# Patient Record
Sex: MALE | Race: BLACK OR AFRICAN AMERICAN
[De-identification: names, ages, dates, MRNs, and addresses within clinical notes are randomized per-mention and may not be internally consistent; named-entity substitution may affect disease eponyms.]

---

## 2023-08-15 ENCOUNTER — RX ONLY (RX ONLY)
Age: 73
End: 2023-08-15

## 2023-08-15 ENCOUNTER — OFFICE (OUTPATIENT)
Dept: URBAN - METROPOLITAN AREA CLINIC 27 | Facility: CLINIC | Age: 73
Setting detail: OPHTHALMOLOGY
End: 2023-08-15
Payer: MEDICARE

## 2023-08-15 DIAGNOSIS — H33.322: ICD-10-CM

## 2023-08-15 DIAGNOSIS — H35.413: ICD-10-CM

## 2023-08-15 DIAGNOSIS — H11.153: ICD-10-CM

## 2023-08-15 DIAGNOSIS — H17.9: ICD-10-CM

## 2023-08-15 DIAGNOSIS — E11.9: ICD-10-CM

## 2023-08-15 DIAGNOSIS — H25.13: ICD-10-CM

## 2023-08-15 DIAGNOSIS — H16.223: ICD-10-CM

## 2023-08-15 PROCEDURE — 99204 OFFICE O/P NEW MOD 45 MIN: CPT | Performed by: OPHTHALMOLOGY

## 2023-08-15 ASSESSMENT — REFRACTION_AUTOREFRACTION
OS_AXIS: 129
OD_CYLINDER: +0.75
OS_CYLINDER: +0.50
OS_SPHERE: -5.50
OD_AXIS: 121
OD_SPHERE: -7.25

## 2023-08-15 ASSESSMENT — REFRACTION_CURRENTRX
OS_OVR_VA: 20/
OD_VPRISM_DIRECTION: SV
OS_VPRISM_DIRECTION: SV
OD_OVR_VA: 20/
OS_SPHERE: +2.75
OD_SPHERE: +2.75

## 2023-08-15 ASSESSMENT — KERATOMETRY
METHOD_AUTO_MANUAL: AUTO
OD_K1POWER_DIOPTERS: 45.25
OD_AXISANGLE_DEGREES: 067
OS_K2POWER_DIOPTERS: 45.75
OD_K2POWER_DIOPTERS: 45.75
OS_AXISANGLE_DEGREES: 106
OS_K1POWER_DIOPTERS: 45.00

## 2023-08-15 ASSESSMENT — VISUAL ACUITY
OD_BCVA: 20/25-3
OS_BCVA: 20/60

## 2023-08-15 ASSESSMENT — SPHEQUIV_DERIVED
OD_SPHEQUIV: -6.875
OS_SPHEQUIV: -5.25

## 2023-08-15 ASSESSMENT — SUPERFICIAL PUNCTATE KERATITIS (SPK)
OS_SPK: 1+
OD_SPK: 1+

## 2023-08-15 ASSESSMENT — CONFRONTATIONAL VISUAL FIELD TEST (CVF)
OD_FINDINGS: FULL
OS_FINDINGS: FULL

## 2023-08-15 ASSESSMENT — TONOMETRY
OD_IOP_MMHG: 15
OS_IOP_MMHG: 13

## 2023-08-15 ASSESSMENT — AXIALLENGTH_DERIVED
OD_AL: 25.7104
OS_AL: 25.0314

## 2023-10-16 ENCOUNTER — OFFICE (OUTPATIENT)
Dept: URBAN - METROPOLITAN AREA CLINIC 27 | Facility: CLINIC | Age: 73
Setting detail: OPHTHALMOLOGY
End: 2023-10-16
Payer: MEDICARE

## 2023-10-16 DIAGNOSIS — H25.13: ICD-10-CM

## 2023-10-16 DIAGNOSIS — H17.9: ICD-10-CM

## 2023-10-16 DIAGNOSIS — H16.223: ICD-10-CM

## 2023-10-16 DIAGNOSIS — H11.153: ICD-10-CM

## 2023-10-16 PROCEDURE — 99213 OFFICE O/P EST LOW 20 MIN: CPT | Performed by: OPHTHALMOLOGY

## 2023-10-16 ASSESSMENT — TONOMETRY
OD_IOP_MMHG: 14
OS_IOP_MMHG: 15

## 2023-10-16 ASSESSMENT — SUPERFICIAL PUNCTATE KERATITIS (SPK)
OS_SPK: 1+
OD_SPK: 1+

## 2023-10-17 ASSESSMENT — KERATOMETRY
OD_K1POWER_DIOPTERS: 45.50
OS_K2POWER_DIOPTERS: 45.75
OS_K1POWER_DIOPTERS: 44.75
OD_AXISANGLE_DEGREES: 90
OS_AXISANGLE_DEGREES: 98
OD_K2POWER_DIOPTERS: 45.75
METHOD_AUTO_MANUAL: AUTO

## 2023-10-17 ASSESSMENT — REFRACTION_CURRENTRX
OD_OVR_VA: 20/
OD_VPRISM_DIRECTION: SV
OS_OVR_VA: 20/
OD_SPHERE: +2.75
OS_VPRISM_DIRECTION: SV
OS_SPHERE: +2.75

## 2023-10-17 ASSESSMENT — REFRACTION_AUTOREFRACTION
OS_AXIS: 137
OS_CYLINDER: +0.50
OS_SPHERE: -5.00
OD_SPHERE: ERR

## 2023-10-17 ASSESSMENT — SPHEQUIV_DERIVED: OS_SPHEQUIV: -4.75

## 2023-10-17 ASSESSMENT — VISUAL ACUITY
OD_BCVA: 20/25-2
OS_BCVA: 20/60-2

## 2023-10-17 ASSESSMENT — AXIALLENGTH_DERIVED: OS_AL: 24.8649

## 2023-10-31 ENCOUNTER — OFFICE (OUTPATIENT)
Dept: URBAN - METROPOLITAN AREA CLINIC 27 | Facility: CLINIC | Age: 73
Setting detail: OPHTHALMOLOGY
End: 2023-10-31
Payer: MEDICARE

## 2023-10-31 DIAGNOSIS — H17.9: ICD-10-CM

## 2023-10-31 DIAGNOSIS — H25.11: ICD-10-CM

## 2023-10-31 DIAGNOSIS — H25.13: ICD-10-CM

## 2023-10-31 DIAGNOSIS — H16.223: ICD-10-CM

## 2023-10-31 DIAGNOSIS — H11.153: ICD-10-CM

## 2023-10-31 PROCEDURE — 99213 OFFICE O/P EST LOW 20 MIN: CPT | Performed by: OPHTHALMOLOGY

## 2023-10-31 PROCEDURE — 92136 OPHTHALMIC BIOMETRY: CPT | Mod: TC | Performed by: OPHTHALMOLOGY

## 2023-10-31 PROCEDURE — 92136 OPHTHALMIC BIOMETRY: CPT | Mod: 26,RT | Performed by: OPHTHALMOLOGY

## 2023-10-31 ASSESSMENT — REFRACTION_CURRENTRX
OD_OVR_VA: 20/
OS_OVR_VA: 20/
OS_VPRISM_DIRECTION: SV
OD_VPRISM_DIRECTION: SV
OS_SPHERE: +2.75
OD_SPHERE: +2.75

## 2023-10-31 ASSESSMENT — TONOMETRY
OS_IOP_MMHG: 11
OD_IOP_MMHG: 12

## 2023-10-31 ASSESSMENT — VISUAL ACUITY
OS_BCVA: 20/60-2
OD_BCVA: 20/25-2

## 2023-10-31 ASSESSMENT — REFRACTION_AUTOREFRACTION
OS_SPHERE: -5.00
OS_AXIS: 137
OS_CYLINDER: +0.50
OD_SPHERE: ERR

## 2023-10-31 ASSESSMENT — SPHEQUIV_DERIVED: OS_SPHEQUIV: -4.75

## 2023-10-31 ASSESSMENT — SUPERFICIAL PUNCTATE KERATITIS (SPK)
OD_SPK: 1+
OS_SPK: 1+

## 2023-11-02 ENCOUNTER — AMBULATORY SURGERY CENTER (OUTPATIENT)
Dept: URBAN - METROPOLITAN AREA SURGERY 19 | Facility: SURGERY | Age: 73
Setting detail: OPHTHALMOLOGY
End: 2023-11-02
Payer: MEDICARE

## 2023-11-02 DIAGNOSIS — H25.11: ICD-10-CM

## 2023-11-02 DIAGNOSIS — H52.211: ICD-10-CM

## 2023-11-02 PROCEDURE — 66984 XCAPSL CTRC RMVL W/O ECP: CPT | Mod: RT | Performed by: OPHTHALMOLOGY

## 2023-11-02 PROCEDURE — FEMTO FEMTOSECOND LASER: Mod: GY | Performed by: OPHTHALMOLOGY

## 2023-11-02 PROCEDURE — A9270 NON-COVERED ITEM OR SERVICE: HCPCS | Mod: GY | Performed by: OPHTHALMOLOGY

## 2023-11-03 ENCOUNTER — OFFICE (OUTPATIENT)
Dept: URBAN - METROPOLITAN AREA CLINIC 27 | Facility: CLINIC | Age: 73
Setting detail: OPHTHALMOLOGY
End: 2023-11-03
Payer: MEDICARE

## 2023-11-03 ENCOUNTER — RX ONLY (RX ONLY)
Age: 73
End: 2023-11-03

## 2023-11-03 DIAGNOSIS — Z96.1: ICD-10-CM

## 2023-11-03 PROBLEM — H25.13 CATARACT NUCLEAR SCLEROSIS AGE RELATED; RIGHT EYE, BOTH EYES: Status: ACTIVE | Noted: 2023-10-31

## 2023-11-03 PROBLEM — H25.11 CATARACT NUCLEAR SCLEROSIS AGE RELATED; RIGHT EYE, BOTH EYES: Status: ACTIVE | Noted: 2023-10-31

## 2023-11-03 PROCEDURE — 99024 POSTOP FOLLOW-UP VISIT: CPT

## 2023-11-03 ASSESSMENT — SUPERFICIAL PUNCTATE KERATITIS (SPK)
OS_SPK: 1+
OD_SPK: 1+

## 2023-11-03 ASSESSMENT — REFRACTION_CURRENTRX
OS_VPRISM_DIRECTION: SV
OD_VPRISM_DIRECTION: SV
OS_OVR_VA: 20/
OD_SPHERE: +2.75
OD_OVR_VA: 20/
OS_SPHERE: +2.75

## 2023-11-03 ASSESSMENT — REFRACTION_AUTOREFRACTION
OS_CYLINDER: +0.25
OD_SPHERE: -0.50
OS_AXIS: 112
OD_CYLINDER: +1.00
OS_SPHERE: -5.50
OD_AXIS: 148

## 2023-11-03 ASSESSMENT — SPHEQUIV_DERIVED
OS_SPHEQUIV: -5.375
OD_SPHEQUIV: 0

## 2023-11-03 ASSESSMENT — CONFRONTATIONAL VISUAL FIELD TEST (CVF)
OD_FINDINGS: FULL
OS_FINDINGS: FULL

## 2023-11-09 ENCOUNTER — OFFICE (OUTPATIENT)
Dept: URBAN - METROPOLITAN AREA CLINIC 27 | Facility: CLINIC | Age: 73
Setting detail: OPHTHALMOLOGY
End: 2023-11-09
Payer: MEDICARE

## 2023-11-09 DIAGNOSIS — Z96.1: ICD-10-CM

## 2023-11-09 PROCEDURE — 99024 POSTOP FOLLOW-UP VISIT: CPT | Performed by: OPHTHALMOLOGY

## 2023-11-09 ASSESSMENT — REFRACTION_AUTOREFRACTION
OD_CYLINDER: +0.25
OD_SPHERE: -1.00
OS_AXIS: 116
OD_AXIS: 105
OS_SPHERE: -6.00
OS_CYLINDER: +0.75

## 2023-11-09 ASSESSMENT — REFRACTION_CURRENTRX
OD_VPRISM_DIRECTION: SV
OD_OVR_VA: 20/
OS_OVR_VA: 20/
OS_SPHERE: +2.75
OD_SPHERE: +2.75
OS_VPRISM_DIRECTION: SV

## 2023-11-09 ASSESSMENT — SPHEQUIV_DERIVED
OS_SPHEQUIV: -5.625
OD_SPHEQUIV: -0.875

## 2023-11-09 ASSESSMENT — SUPERFICIAL PUNCTATE KERATITIS (SPK)
OD_SPK: 1+
OS_SPK: 1+

## 2023-11-30 ENCOUNTER — OFFICE (OUTPATIENT)
Dept: URBAN - METROPOLITAN AREA CLINIC 27 | Facility: CLINIC | Age: 73
Setting detail: OPHTHALMOLOGY
End: 2023-11-30
Payer: MEDICARE

## 2023-11-30 DIAGNOSIS — H25.12: ICD-10-CM

## 2023-11-30 PROBLEM — Z96.1 PSEUDOPHAKIA-1 DAY P/O CAT W/ IOL: Status: ACTIVE | Noted: 2023-11-30

## 2023-11-30 PROCEDURE — 92136 OPHTHALMIC BIOMETRY: CPT | Mod: 26,LT | Performed by: OPHTHALMOLOGY

## 2023-11-30 ASSESSMENT — REFRACTION_AUTOREFRACTION
OS_AXIS: 113
OD_SPHERE: -0.50
OD_AXIS: 104
OD_CYLINDER: +0.25
OS_SPHERE: -5.75
OS_CYLINDER: +1.00

## 2023-11-30 ASSESSMENT — SUPERFICIAL PUNCTATE KERATITIS (SPK)
OS_SPK: 1+
OD_SPK: 1+

## 2023-11-30 ASSESSMENT — REFRACTION_CURRENTRX
OS_SPHERE: +2.75
OD_VPRISM_DIRECTION: SV
OS_OVR_VA: 20/
OD_OVR_VA: 20/
OD_SPHERE: +2.75
OS_VPRISM_DIRECTION: SV

## 2023-11-30 ASSESSMENT — SPHEQUIV_DERIVED
OD_SPHEQUIV: -0.375
OS_SPHEQUIV: -5.25

## 2024-01-18 ENCOUNTER — AMBULATORY SURGERY CENTER (OUTPATIENT)
Dept: URBAN - METROPOLITAN AREA SURGERY 19 | Facility: SURGERY | Age: 74
Setting detail: OPHTHALMOLOGY
End: 2024-01-18
Payer: MEDICARE

## 2024-01-18 DIAGNOSIS — H25.12: ICD-10-CM

## 2024-01-18 DIAGNOSIS — H52.212: ICD-10-CM

## 2024-01-18 PROCEDURE — A9270 NON-COVERED ITEM OR SERVICE: HCPCS | Mod: GY | Performed by: OPHTHALMOLOGY

## 2024-01-18 PROCEDURE — FEMTO FEMTOSECOND LASER: Mod: GY | Performed by: OPHTHALMOLOGY

## 2024-01-18 PROCEDURE — 66984 XCAPSL CTRC RMVL W/O ECP: CPT | Mod: 79,LT | Performed by: OPHTHALMOLOGY

## 2024-01-18 PROCEDURE — V2787 ASTIGMATISM-CORRECT FUNCTION: HCPCS | Performed by: OPHTHALMOLOGY

## 2024-01-19 ENCOUNTER — OFFICE (OUTPATIENT)
Dept: URBAN - METROPOLITAN AREA CLINIC 27 | Facility: CLINIC | Age: 74
Setting detail: OPHTHALMOLOGY
End: 2024-01-19
Payer: MEDICARE

## 2024-01-19 ENCOUNTER — RX ONLY (RX ONLY)
Age: 74
End: 2024-01-19

## 2024-01-19 DIAGNOSIS — Z96.1: ICD-10-CM

## 2024-01-19 DIAGNOSIS — H26.492: ICD-10-CM

## 2024-01-19 PROCEDURE — 99024 POSTOP FOLLOW-UP VISIT: CPT | Performed by: OPTOMETRIST

## 2024-01-19 ASSESSMENT — REFRACTION_CURRENTRX
OD_VPRISM_DIRECTION: SV
OS_SPHERE: +2.75
OS_VPRISM_DIRECTION: SV
OD_SPHERE: +2.75
OD_OVR_VA: 20/
OS_OVR_VA: 20/

## 2024-01-19 ASSESSMENT — REFRACTION_AUTOREFRACTION
OD_CYLINDER: +0.25
OS_AXIS: 180
OD_SPHERE: -0.25
OD_AXIS: 096
OS_CYLINDER: +1.25
OS_SPHERE: -0.75

## 2024-01-19 ASSESSMENT — SPHEQUIV_DERIVED
OS_SPHEQUIV: -0.125
OD_SPHEQUIV: -0.125

## 2024-01-19 ASSESSMENT — SUPERFICIAL PUNCTATE KERATITIS (SPK)
OD_SPK: 1+
OS_SPK: 1+

## 2024-01-25 ENCOUNTER — OFFICE (OUTPATIENT)
Dept: URBAN - METROPOLITAN AREA CLINIC 27 | Facility: CLINIC | Age: 74
Setting detail: OPHTHALMOLOGY
End: 2024-01-25
Payer: MEDICARE

## 2024-01-25 DIAGNOSIS — H26.492: ICD-10-CM

## 2024-01-25 DIAGNOSIS — Z96.1: ICD-10-CM

## 2024-01-25 DIAGNOSIS — H18.592: ICD-10-CM

## 2024-01-25 PROCEDURE — 99024 POSTOP FOLLOW-UP VISIT: CPT | Performed by: OPHTHALMOLOGY

## 2024-01-25 ASSESSMENT — REFRACTION_AUTOREFRACTION
OS_CYLINDER: +0.50
OD_CYLINDER: +0.50
OS_AXIS: 73
OD_SPHERE: -0.50
OS_SPHERE: -1.00
OD_AXIS: 90

## 2024-01-25 ASSESSMENT — REFRACTION_CURRENTRX
OS_OVR_VA: 20/
OS_SPHERE: +2.75
OD_VPRISM_DIRECTION: SV
OD_OVR_VA: 20/
OS_VPRISM_DIRECTION: SV
OD_SPHERE: +2.75

## 2024-01-25 ASSESSMENT — SUPERFICIAL PUNCTATE KERATITIS (SPK)
OD_SPK: 1+
OS_SPK: 1+

## 2024-01-25 ASSESSMENT — SPHEQUIV_DERIVED
OD_SPHEQUIV: -0.25
OS_SPHEQUIV: -0.75

## 2024-02-26 ENCOUNTER — OFFICE (OUTPATIENT)
Dept: URBAN - METROPOLITAN AREA CLINIC 27 | Facility: CLINIC | Age: 74
Setting detail: OPHTHALMOLOGY
End: 2024-02-26
Payer: MEDICARE

## 2024-02-26 DIAGNOSIS — Z96.1: ICD-10-CM

## 2024-02-26 PROCEDURE — 99024 POSTOP FOLLOW-UP VISIT: CPT | Performed by: OPHTHALMOLOGY

## 2024-02-26 ASSESSMENT — REFRACTION_CURRENTRX
OD_SPHERE: +2.75
OD_VPRISM_DIRECTION: SV
OD_OVR_VA: 20/
OS_OVR_VA: 20/
OS_SPHERE: +2.75
OS_VPRISM_DIRECTION: SV

## 2024-02-26 ASSESSMENT — REFRACTION_AUTOREFRACTION
OS_CYLINDER: SPH
OD_SPHERE: -0.25
OS_SPHERE: -0.25
OD_CYLINDER: +0.25
OD_AXIS: 077

## 2024-02-26 ASSESSMENT — SUPERFICIAL PUNCTATE KERATITIS (SPK)
OS_SPK: 1+
OD_SPK: 1+

## 2024-02-26 ASSESSMENT — CONFRONTATIONAL VISUAL FIELD TEST (CVF)
OS_FINDINGS: FULL
OD_FINDINGS: FULL

## 2024-02-26 ASSESSMENT — SPHEQUIV_DERIVED: OD_SPHEQUIV: -0.125

## 2024-12-17 ENCOUNTER — OFFICE (OUTPATIENT)
Dept: URBAN - METROPOLITAN AREA CLINIC 27 | Facility: CLINIC | Age: 74
Setting detail: OPHTHALMOLOGY
End: 2024-12-17
Payer: MEDICARE

## 2024-12-17 DIAGNOSIS — H11.153: ICD-10-CM

## 2024-12-17 DIAGNOSIS — H16.223: ICD-10-CM

## 2024-12-17 DIAGNOSIS — H33.322: ICD-10-CM

## 2024-12-17 DIAGNOSIS — H35.413: ICD-10-CM

## 2024-12-17 DIAGNOSIS — E11.9: ICD-10-CM

## 2024-12-17 DIAGNOSIS — H17.9: ICD-10-CM

## 2024-12-17 PROCEDURE — 92014 COMPRE OPH EXAM EST PT 1/>: CPT | Performed by: OPHTHALMOLOGY

## 2024-12-17 ASSESSMENT — SUPERFICIAL PUNCTATE KERATITIS (SPK)
OD_SPK: 1+
OS_SPK: 1+

## 2024-12-17 ASSESSMENT — KERATOMETRY
OD_K2POWER_DIOPTERS: 46.00
OS_K2POWER_DIOPTERS: 45.75
METHOD_AUTO_MANUAL: AUTO
OD_AXISANGLE_DEGREES: 096
OS_K1POWER_DIOPTERS: 45.50
OS_AXISANGLE_DEGREES: 125
OD_K1POWER_DIOPTERS: 45.50

## 2024-12-17 ASSESSMENT — VISUAL ACUITY
OD_BCVA: 20/20-1
OS_BCVA: 20/25+2

## 2024-12-17 ASSESSMENT — REFRACTION_AUTOREFRACTION
OS_AXIS: 022
OD_AXIS: 131
OS_CYLINDER: +0.50
OD_SPHERE: -0.50
OS_SPHERE: -0.50
OD_CYLINDER: +0.25

## 2024-12-17 ASSESSMENT — REFRACTION_CURRENTRX
OS_SPHERE: +2.75
OD_OVR_VA: 20/
OS_OVR_VA: 20/
OD_VPRISM_DIRECTION: SV
OS_VPRISM_DIRECTION: SV
OD_SPHERE: +2.75

## 2024-12-17 ASSESSMENT — TONOMETRY
OD_IOP_MMHG: 12
OS_IOP_MMHG: 13

## 2024-12-17 ASSESSMENT — CONFRONTATIONAL VISUAL FIELD TEST (CVF)
OD_FINDINGS: FULL
OS_FINDINGS: FULL

## 2025-03-19 PROBLEM — Z00.00 ENCOUNTER FOR PREVENTIVE HEALTH EXAMINATION: Status: ACTIVE | Noted: 2025-03-19

## 2025-03-24 ENCOUNTER — APPOINTMENT (OUTPATIENT)
Dept: GASTROENTEROLOGY | Facility: CLINIC | Age: 75
End: 2025-03-24
Payer: MEDICARE

## 2025-03-24 DIAGNOSIS — K63.5 POLYP OF COLON: ICD-10-CM

## 2025-03-24 PROCEDURE — 99202 OFFICE O/P NEW SF 15 MIN: CPT | Mod: 93

## 2025-03-24 RX ORDER — SODIUM SULFATE, POTASSIUM SULFATE AND MAGNESIUM SULFATE 1.6; 3.13; 17.5 G/177ML; G/177ML; G/177ML
17.5-3.13-1.6 SOLUTION ORAL
Qty: 1 | Refills: 0 | Status: ACTIVE | COMMUNITY
Start: 2025-03-24 | End: 1900-01-01

## 2025-05-13 ENCOUNTER — APPOINTMENT (OUTPATIENT)
Dept: GASTROENTEROLOGY | Facility: HOSPITAL | Age: 75
End: 2025-05-13

## 2025-05-13 ENCOUNTER — TRANSCRIPTION ENCOUNTER (OUTPATIENT)
Age: 75
End: 2025-05-13

## 2025-05-13 ENCOUNTER — OUTPATIENT (OUTPATIENT)
Dept: OUTPATIENT SERVICES | Facility: HOSPITAL | Age: 75
LOS: 1 days | End: 2025-05-13
Payer: MEDICARE

## 2025-05-13 ENCOUNTER — RESULT REVIEW (OUTPATIENT)
Age: 75
End: 2025-05-13

## 2025-05-13 VITALS
TEMPERATURE: 97 F | SYSTOLIC BLOOD PRESSURE: 161 MMHG | OXYGEN SATURATION: 98 % | RESPIRATION RATE: 16 BRPM | HEART RATE: 65 BPM | WEIGHT: 212.97 LBS | DIASTOLIC BLOOD PRESSURE: 74 MMHG | HEIGHT: 69 IN

## 2025-05-13 VITALS
DIASTOLIC BLOOD PRESSURE: 69 MMHG | SYSTOLIC BLOOD PRESSURE: 155 MMHG | HEART RATE: 59 BPM | OXYGEN SATURATION: 99 % | RESPIRATION RATE: 16 BRPM

## 2025-05-13 DIAGNOSIS — K63.5 POLYP OF COLON: ICD-10-CM

## 2025-05-13 LAB — GLUCOSE BLDC GLUCOMTR-MCNC: 98 MG/DL — SIGNIFICANT CHANGE UP (ref 70–99)

## 2025-05-13 PROCEDURE — C1889: CPT

## 2025-05-13 PROCEDURE — 45390 COLONOSCOPY W/RESECTION: CPT | Mod: GC

## 2025-05-13 PROCEDURE — 45390 COLONOSCOPY W/RESECTION: CPT

## 2025-05-13 PROCEDURE — 88305 TISSUE EXAM BY PATHOLOGIST: CPT

## 2025-05-13 PROCEDURE — 88305 TISSUE EXAM BY PATHOLOGIST: CPT | Mod: 26

## 2025-05-13 PROCEDURE — 82962 GLUCOSE BLOOD TEST: CPT

## 2025-05-13 DEVICE — GEL PURASTAT 3ML: Type: IMPLANTABLE DEVICE | Status: FUNCTIONAL

## 2025-05-13 DEVICE — CLIP RESOLUTION 360 235CM: Type: IMPLANTABLE DEVICE | Status: FUNCTIONAL

## 2025-05-13 DEVICE — NET RETRV ROT ROTH 2.5MMX230CM: Type: IMPLANTABLE DEVICE | Status: FUNCTIONAL

## 2025-05-13 DEVICE — CLIP MANTIS 235CMX2.8MM: Type: IMPLANTABLE DEVICE | Status: FUNCTIONAL

## 2025-05-13 DEVICE — CLIP RESOLUTION 360 ULTRA 235CM 20/BX: Type: IMPLANTABLE DEVICE | Status: FUNCTIONAL

## 2025-05-13 RX ORDER — ORAL SEMAGLUTIDE 14 MG/1
2 TABLET ORAL
Refills: 0 | DISCHARGE

## 2025-05-13 RX ORDER — AMLODIPINE AND BENAZEPRIL HYDROCHLORIDE 5; 20 MG/1; MG/1
1 CAPSULE ORAL
Refills: 0 | DISCHARGE

## 2025-05-13 RX ORDER — INSULIN GLARGINE-YFGN 100 [IU]/ML
0 INJECTION, SOLUTION SUBCUTANEOUS
Refills: 0 | DISCHARGE

## 2025-05-13 RX ORDER — LABETALOL HYDROCHLORIDE 200 MG/1
1 TABLET, FILM COATED ORAL
Refills: 0 | DISCHARGE

## 2025-05-13 RX ORDER — ATORVASTATIN CALCIUM 80 MG/1
1 TABLET, FILM COATED ORAL
Refills: 0 | DISCHARGE

## 2025-05-13 RX ORDER — DAPAGLIFLOZIN 5 MG/1
1 TABLET, FILM COATED ORAL
Refills: 0 | DISCHARGE

## 2025-05-13 NOTE — ASU PATIENT PROFILE, ADULT - FALL HARM RISK - HARM RISK INTERVENTIONS

## 2025-05-13 NOTE — ASU DISCHARGE PLAN (ADULT/PEDIATRIC) - FINANCIAL ASSISTANCE
[facility] provides services at a reduced cost to those who are determined to be eligible through [facility]’s financial assistance program. Information regarding [facility]’s financial assistance program can be found by going to [email or web address] or by calling [phone].

## 2025-05-13 NOTE — PRE PROCEDURE NOTE - PRE PROCEDURE EVALUATION
Attending Physician: Darrius                   Procedure: colonoscopy     Indication for Procedure: cecum polyp EMR  ________________________________________________________  PAST MEDICAL & SURGICAL HISTORY:    ALLERGIES:    HOME MEDICATIONS:    AICD/PPM: [ ] yes   [x ] no    PERTINENT LAB DATA:                      PHYSICAL EXAMINATION:    vitals wnl    Constitutional: NAD  Neck:  supple  Respiratory: no respiratory stress, no audible wheezes  Cardiovascular: RR  Gastrointestinal: soft, NT/ND  Extremities: No peripheral edema  Neurological: Alert, no focal deficits  Psychiatric: Normal mood, normal affect  Skin: No rashes      COMMENTS:    The patient is a suitable candidate for the planned procedure unless box checked [ ]  No, explain:

## 2025-05-15 LAB — SURGICAL PATHOLOGY STUDY: SIGNIFICANT CHANGE UP

## 2025-05-16 RX ORDER — SODIUM SULFATE, POTASSIUM SULFATE AND MAGNESIUM SULFATE 1.6; 3.13; 17.5 G/177ML; G/177ML; G/177ML
17.5-3.13-1.6 SOLUTION ORAL
Qty: 1 | Refills: 0 | Status: ACTIVE | COMMUNITY
Start: 2025-05-16 | End: 1900-01-01

## (undated) DEVICE — CATH IV SAFE BC 22G X 1" (BLUE)

## (undated) DEVICE — PACK IV START WITH CHG

## (undated) DEVICE — SYR LUER LOK 50CC

## (undated) DEVICE — SOL INJ NS 0.9% 500ML 2 PORT

## (undated) DEVICE — BIOPSY FORCEP RADIAL JAW 4 STANDARD WITH NEEDLE

## (undated) DEVICE — TUBING SUCTION CONN 6FT STERILE

## (undated) DEVICE — ELCTR GROUNDING PAD ADULT COVIDIEN

## (undated) DEVICE — SUCTION YANKAUER NO CONTROL VENT

## (undated) DEVICE — BRUSH COLONOSCOPY CYTOLOGY

## (undated) DEVICE — TUBING IV SET GRAVITY 3Y 100" MACRO

## (undated) DEVICE — CATH IV SAFE BC 20G X 1.16" (PINK)

## (undated) DEVICE — ATTACHMENT DISTAL 4X15MM

## (undated) DEVICE — CLAMP BX HOT RAD JAW 3

## (undated) DEVICE — TUBING SUCTION 20FT

## (undated) DEVICE — FOLEY HOLDER STATLOCK 2 WAY ADULT

## (undated) DEVICE — Device

## (undated) DEVICE — CATH SPRAY 1.8X2200MM BLU

## (undated) DEVICE — FORCEP RADIAL JAW 4 JUMBO 2.8MM 3.2MM 240CM ORANGE DISP

## (undated) DEVICE — POLY TRAP ETRAP

## (undated) DEVICE — SNARE CAPTIVATOR II RND STIFF 15MM

## (undated) DEVICE — SENSOR O2 FINGER ADULT

## (undated) DEVICE — IRRIGATOR BIO SHIELD